# Patient Record
Sex: FEMALE | Race: WHITE | HISPANIC OR LATINO | Employment: FULL TIME | URBAN - METROPOLITAN AREA
[De-identification: names, ages, dates, MRNs, and addresses within clinical notes are randomized per-mention and may not be internally consistent; named-entity substitution may affect disease eponyms.]

---

## 2019-11-04 ENCOUNTER — HOSPITAL ENCOUNTER (EMERGENCY)
Facility: HOSPITAL | Age: 39
Discharge: HOME | End: 2019-11-04
Attending: EMERGENCY MEDICINE

## 2019-11-04 VITALS
DIASTOLIC BLOOD PRESSURE: 78 MMHG | HEIGHT: 60 IN | RESPIRATION RATE: 20 BRPM | WEIGHT: 150 LBS | BODY MASS INDEX: 29.45 KG/M2 | SYSTOLIC BLOOD PRESSURE: 122 MMHG | HEART RATE: 77 BPM | OXYGEN SATURATION: 99 % | TEMPERATURE: 99 F

## 2019-11-04 DIAGNOSIS — S09.90XA INJURY OF HEAD, INITIAL ENCOUNTER: Primary | ICD-10-CM
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
## 2019-11-04 LAB
B-HCG UR QL: NEGATIVE
CTP QC/QA: YES

## 2019-11-04 PROCEDURE — 99284 EMERGENCY DEPT VISIT MOD MDM: CPT | Mod: 25

## 2019-11-04 PROCEDURE — 25000003 PHARM REV CODE 250: Performed by: EMERGENCY MEDICINE

## 2019-11-04 PROCEDURE — 81025 URINE PREGNANCY TEST: CPT | Performed by: EMERGENCY MEDICINE

## 2019-11-04 RX ORDER — ACETAMINOPHEN 500 MG
1000 TABLET ORAL
Status: COMPLETED | OUTPATIENT
Start: 2019-11-04 | End: 2019-11-04

## 2019-11-04 RX ORDER — IBUPROFEN 600 MG/1
600 TABLET ORAL EVERY 6 HOURS PRN
Qty: 20 TABLET | Refills: 0 | OUTPATIENT
Start: 2019-11-04 | End: 2019-11-16

## 2019-11-04 RX ADMIN — ACETAMINOPHEN 1000 MG: 500 TABLET ORAL at 07:11

## 2019-11-04 NOTE — ED NOTES
GARRETT Bland at bedside to translate at this time; patient states she was taking baking pans down when they fell on her causing her to fall backwards and hit her head. She states she lost consciousness for a few second.Pt was ambulatory to restroom with steady gait no deficits noted. She states she has pain all over her head neck shoulder back and legs. C-Collar placed at this time.

## 2019-11-04 NOTE — ED NOTES
Pt report received from SANDRA Alberts. Pt lying on stretcher with c collar in place. Pain medication given by off going nurse with  SANDRA Bolaños. Pt c/ of headache, neck pain, and generalized body aches and sores. Will continue to monitor.

## 2019-11-16 ENCOUNTER — HOSPITAL ENCOUNTER (EMERGENCY)
Facility: HOSPITAL | Age: 39
Discharge: HOME | End: 2019-11-16
Attending: EMERGENCY MEDICINE

## 2019-11-16 VITALS
HEIGHT: 59 IN | BODY MASS INDEX: 30.24 KG/M2 | TEMPERATURE: 99 F | HEART RATE: 68 BPM | WEIGHT: 150 LBS | DIASTOLIC BLOOD PRESSURE: 83 MMHG | OXYGEN SATURATION: 100 % | RESPIRATION RATE: 19 BRPM | SYSTOLIC BLOOD PRESSURE: 129 MMHG
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
## 2019-11-16 DIAGNOSIS — S06.0X1A CONCUSSION WITH LOSS OF CONSCIOUSNESS OF 30 MINUTES OR LESS, INITIAL ENCOUNTER: ICD-10-CM

## 2019-11-16 LAB
B-HCG UR QL: NEGATIVE
CTP QC/QA: YES

## 2019-11-16 PROCEDURE — 63600175 PHARM REV CODE 636 W HCPCS: Performed by: EMERGENCY MEDICINE

## 2019-11-16 PROCEDURE — 81025 URINE PREGNANCY TEST: CPT | Performed by: EMERGENCY MEDICINE

## 2019-11-16 PROCEDURE — 96372 THER/PROPH/DIAG INJ SC/IM: CPT

## 2019-11-16 PROCEDURE — 99284 EMERGENCY DEPT VISIT MOD MDM: CPT | Mod: 25

## 2019-11-16 RX ORDER — IBUPROFEN 600 MG/1
600 TABLET ORAL EVERY 6 HOURS PRN
Qty: 28 TABLET | Refills: 0 | Status: SHIPPED | OUTPATIENT
Start: 2019-11-16

## 2019-11-16 RX ORDER — KETOROLAC TROMETHAMINE 30 MG/ML
30 INJECTION, SOLUTION INTRAMUSCULAR; INTRAVENOUS
Status: COMPLETED | OUTPATIENT
Start: 2019-11-16 | End: 2019-11-16

## 2019-11-16 RX ORDER — ACETAMINOPHEN 500 MG
500 TABLET ORAL EVERY 6 HOURS PRN
Qty: 28 TABLET | Refills: 0 | Status: SHIPPED | OUTPATIENT
Start: 2019-11-16

## 2019-11-16 RX ADMIN — KETOROLAC TROMETHAMINE 30 MG: 30 INJECTION, SOLUTION INTRAMUSCULAR at 06:11

## 2019-11-16 NOTE — ED NOTES
Patient was involved in an accident two weeks ago where racks fell on her and she lost loc. Patient stated she was treated for that two weeks ago and was cleared. Pt is returning today for HA, generalized body pains with numbness to her bilateral upper extremities in her hands. Patient ambulates without difficulty and no problems voiding. Patient moves neck freely.  used for HPI and instructed patient to collect urine specimen.     APPEARANCE: Alert, oriented and in no acute distress.  CARDIAC: Normal rate and rhythm, no murmur heard.   PERIPHERAL VASCULAR: peripheral pulses present. Normal cap refill. No edema. Warm to touch.    RESPIRATORY:Normal rate and effort, breath sounds clear bilaterally throughout chest. Respirations are equal and unlabored no obvious signs of distress.  GASTRO: soft, bowel sounds normal, no tenderness, no abdominal distention.  MUSC: Full ROM. No bony tenderness or soft tissue tenderness. No obvious deformity.  SKIN: Skin is warm and dry, normal skin turgor, mucous membranes moist.  NEURO: 5/5 strength major flexors/extensors bilaterally. Sensory intact to light touch bilaterally. Abdoulaye coma scale: eyes open spontaneously-4, oriented & converses-5, obeys commands-6. No neurological abnormalities.   MENTAL STATUS: awake, alert and aware of environment.

## 2019-11-16 NOTE — ED PROVIDER NOTES
Encounter Date: 2019    SCRIBE #1 NOTE: I, Jovita Singh, am scribing for, and in the presence of,  Dr. Mcintosh. I have scribed the entire note.       History     Chief Complaint   Patient presents with    Fall     pt presents to ED today reports fell at work while carrying racks of bread. pt fell backwards hit head and reports LOC. pt was seen here last week in ED. pt c/o pain all over body, both arms, and head.      Suzette Garland is a 39 y.o. female who  has no past medical history on file.    The patient presents to the ED due to generalized pain s/p fall.  The patient slipped and fell backwards on steps on  while carrying heavy baking sheets at work.  She momentarily lost consciousness due to the baking sheets hitting her head.  The patient came into the ED and an XR C-spine and CT head were taken.   She now reports bilateral arm pain to wrists, HA, and generalized pain.  She has been taking Naproxen for her symptoms.    The history is provided by the patient.     Review of patient's allergies indicates:  No Known Allergies  No past medical history on file.  Past Surgical History:   Procedure Laterality Date     SECTION      CHOLECYSTECTOMY       No family history on file.  Social History     Tobacco Use    Smoking status: Never Smoker    Smokeless tobacco: Never Used   Substance Use Topics    Alcohol use: Never     Frequency: Never    Drug use: Never     Review of Systems   Constitutional: Negative for chills and fever.   HENT: Negative for sore throat.    Respiratory: Negative for cough and shortness of breath.    Cardiovascular: Negative for chest pain.   Gastrointestinal: Negative for nausea and vomiting.   Genitourinary: Negative for dysuria, frequency and urgency.   Musculoskeletal: Negative for back pain, neck pain and neck stiffness.        Generalized pain  Bilateral arm pain   Skin: Negative for rash and wound.   Neurological: Positive for headaches. Negative for syncope and  weakness.   Hematological: Does not bruise/bleed easily.   Psychiatric/Behavioral: Negative for agitation, behavioral problems and confusion.   All other systems reviewed and are negative.      Physical Exam     Initial Vitals   BP Pulse Resp Temp SpO2   11/16/19 1644 11/16/19 1644 11/16/19 1644 11/16/19 1648 11/16/19 1644   129/83 68 19 98.8 °F (37.1 °C) 100 %      MAP       --                Physical Exam    Nursing note and vitals reviewed.  Constitutional:  Non-toxic appearance. No distress.   HENT:   Head: Normocephalic and atraumatic.   Eyes: Conjunctivae and EOM are normal. Pupils are equal, round, and reactive to light. Right eye exhibits no nystagmus. Left eye exhibits no nystagmus.   Neck: Neck supple.   Cardiovascular: Regular rhythm, S1 normal and S2 normal.   No murmur heard.  Pulmonary/Chest: Breath sounds normal. No respiratory distress. She has no wheezes. She has no rales.   Abdominal: Soft. She exhibits no distension. There is no tenderness.   Musculoskeletal:   SILT to BUE  Radial pulse palpable  No hand snuffbox elbow or shoulder tenderness  No deformity noted to BUE    Diffuse b/l wrist pain      SILT to BLE  DP/PT pulses palpable to BLE  No ankle knee or hip tenderness bilaterally  No deformity to BLE     Neurological: She is alert. No cranial nerve deficit. GCS eye subscore is 4. GCS verbal subscore is 5. GCS motor subscore is 6.   CN 2-12 intact  Finger to nose within normal limits  Negative romberg  Gait normal  Equal strength to bilateral upper extremities, SILT  Equal strength to bilateral lower extremities, SILT     Skin: Skin is warm. No rash noted.   Psychiatric: She has a normal mood and affect. Her behavior is normal.         ED Course   Procedures  Labs Reviewed   POCT URINE PREGNANCY          Imaging Results          X-Ray Wrist Complete Left (Final result)  Result time 11/16/19 18:29:47    Final result by Rod Avendaño MD (11/16/19 18:29:47)                 Impression:      1.  No acute displaced fracture or dislocation of the wrist.      Electronically signed by: Rod Avendaño MD  Date:    11/16/2019  Time:    18:29             Narrative:    EXAMINATION:  XR WRIST COMPLETE 3 VIEWS LEFT    TECHNIQUE:  PA, lateral, and oblique views of the left wrist were performed.    COMPARISON:  None    FINDINGS:  Three views left wrist.    No acute displaced fracture or dislocation of the wrist.  No radiopaque foreign body.  No significant edema.                               X-Ray Wrist Complete Right (Final result)  Result time 11/16/19 18:30:13    Final result by Rod Avendaño MD (11/16/19 18:30:13)                 Impression:      1. No acute displaced fracture or dislocation of the wrist.      Electronically signed by: Rod Avendaño MD  Date:    11/16/2019  Time:    18:30             Narrative:    EXAMINATION:  XR WRIST COMPLETE 3 VIEWS RIGHT    TECHNIQUE:  PA, lateral, and oblique views of the right wrist were performed.    COMPARISON:  None    FINDINGS:  Three views right wrist.    No acute displaced fracture or dislocation of the wrist.  No radiopaque foreign body.  No significant edema.                                 Medical Decision Making:   Differential Diagnosis:   Differential Diagnosis includes, but is not limited to:  Fracture, dislocation, compartment syndrome, nerve injury/palsy, vascular injury, rhabdomyolysis, hemarthrosis, septic joint, bursitis, muscle strain, ligament tear/sprain, laceration with foreign body, abrasion, soft tissue contusion, osteoarthritis.  Clinical Tests:   Lab Tests: Reviewed and Ordered  Radiological Study: Reviewed and Ordered  ED Management:  39 year old female with persistent headache body aches and wrist pain s/p fall 11/4. Exam unremarkable. Neuro intact. XR of wrist given persistent pain w/o evidence of fracture. I suspect concussion. I recommended rest, supportive care, more frequent nsaid/ apap. PCP follow up. Return precautions for  worsening symptoms numbness focal weakness or any other concerns.    After taking into careful account the historical factors and physical exam findings of the patient's presentation today, in conjunction with the empirical and objective data obtained on ED workup, no acute emergent medical condition has been identified. The patient appears to be low risk for an emergent medical condition and I feel it is safe and appropriate at this time for the patient to be discharged to follow-up as detailed in their discharge instructions for reevaluation and possible continued outpatient workup and management. I have discussed the specifics of the workup with the patient and the patient has verbalized understanding of the details of the workup, the diagnosis, the treatment plan, and the need for outpatient follow-up.  Although the patient has no emergent etiology today this does not preclude the development of an emergent condition so in addition, I have advised the patient that they can return to the ED and/or activate EMS at any time with worsening of their symptoms, change of their symptoms, or with any other medical complaint.  The patient remained comfortable and stable during their visit in the ED.  Discharge and follow-up instructions discussed with the patient who expressed understanding and willingness to comply with my recommendations.                                     Clinical Impression:       ICD-10-CM ICD-9-CM   1. Fall, subsequent encounter W19.XXXD V58.89     E888.9   2. Fall W19.XXXA E888.9   3. Concussion with loss of consciousness of 30 minutes or less, initial encounter S06.0X1A 850.11         Disposition:   Disposition: Discharged  Condition: Stable        Scribe attestation I, Jose D Mcintosh,  personally performed the services described in this documentation. All medical record entries made by the scribe were at my direction and in my presence.  I have reviewed the chart and agree that the record  reflects my personal performance and is accurate and complete. Jose D Mcintosh M.D. 12:38 PM11/17/2019    Portions of this note were dictated using voice recognition software and may contain dictation related errors in spelling/grammar/syntax not found on text review             Jose D Mcintosh Jr., MD  11/17/19 2302

## 2019-11-17 NOTE — ED NOTES
Informed patient that her pregnancy test Is negative. Asked patient if her right wrist pain and swelling gotten worse or same since her accident and patient reported that she can barely use her right wrist and hand. She has not been able to grab objects and has noticed she has had some discoloration in the right hand. Patient reported she had the same symptoms in her hand two weeks ago post injury and no change from when she intially treated. Instructed patient that I am administering her something for pain

## 2019-11-17 NOTE — ED NOTES
Patient tolerated IM injection. Patient was able to apply pressure to lift herself from bed with use of both upper extremities. Will reassess